# Patient Record
Sex: FEMALE | Race: WHITE
[De-identification: names, ages, dates, MRNs, and addresses within clinical notes are randomized per-mention and may not be internally consistent; named-entity substitution may affect disease eponyms.]

---

## 2019-03-21 ENCOUNTER — HOSPITAL ENCOUNTER (OUTPATIENT)
Dept: HOSPITAL 92 - BICRAD | Age: 24
Discharge: HOME | End: 2019-03-21
Attending: NURSE PRACTITIONER
Payer: COMMERCIAL

## 2019-03-21 DIAGNOSIS — M79.601: Primary | ICD-10-CM

## 2019-03-21 DIAGNOSIS — S53.001A: ICD-10-CM

## 2019-03-21 NOTE — RAD
RIGHT ELBOW 4 VIEWS:

 

HISTORY: 

Elbow pain.  Johnson's syndrome.  Limited range of motion.

 

FINDINGS: 

There is 2/3 shaft width posterior and  shaft width lateral subluxation of the radial head in rela
tion to the capitellum, with a relatively small anterior and lateral margin of the radial head.  No a
cute fracture, dislocation, or fluid distention of the joint capsule are apparent.

 

IMPRESSION: 

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  This 
could represent a congenital process, not known to be associated necessarily with Johnson syndrome, or
 result of a remote injury.  No acute osseous abnormalities are demonstrated.

 

POS: [facility]

## 2021-04-08 ENCOUNTER — HOSPITAL ENCOUNTER (OUTPATIENT)
Dept: HOSPITAL 92 - BICMRI | Age: 26
Discharge: HOME | End: 2021-04-08
Attending: STUDENT IN AN ORGANIZED HEALTH CARE EDUCATION/TRAINING PROGRAM
Payer: COMMERCIAL

## 2021-04-08 DIAGNOSIS — R32: Primary | ICD-10-CM

## 2021-04-08 PROCEDURE — 70553 MRI BRAIN STEM W/O & W/DYE: CPT
